# Patient Record
Sex: MALE | Race: WHITE | NOT HISPANIC OR LATINO | Employment: UNEMPLOYED | URBAN - METROPOLITAN AREA
[De-identification: names, ages, dates, MRNs, and addresses within clinical notes are randomized per-mention and may not be internally consistent; named-entity substitution may affect disease eponyms.]

---

## 2024-01-29 ENCOUNTER — HOSPITAL ENCOUNTER (EMERGENCY)
Facility: HOSPITAL | Age: 64
Discharge: HOME | End: 2024-01-29
Attending: EMERGENCY MEDICINE
Payer: MEDICAID

## 2024-01-29 VITALS
SYSTOLIC BLOOD PRESSURE: 150 MMHG | WEIGHT: 146.38 LBS | OXYGEN SATURATION: 99 % | HEART RATE: 89 BPM | HEIGHT: 67 IN | TEMPERATURE: 98 F | RESPIRATION RATE: 19 BRPM | DIASTOLIC BLOOD PRESSURE: 99 MMHG | BODY MASS INDEX: 22.98 KG/M2

## 2024-01-29 DIAGNOSIS — M54.50 ACUTE LEFT-SIDED LOW BACK PAIN WITHOUT SCIATICA: ICD-10-CM

## 2024-01-29 DIAGNOSIS — N47.6 BALANOPOSTHITIS: Primary | ICD-10-CM

## 2024-01-29 LAB
ALBUMIN SERPL-MCNC: 3.8 G/DL (ref 3.4–4.8)
ALBUMIN/GLOB SERPL: 1.2 RATIO (ref 1.1–2)
ALP SERPL-CCNC: 76 UNIT/L (ref 40–150)
ALT SERPL-CCNC: 16 UNIT/L (ref 0–55)
APPEARANCE UR: CLEAR
AST SERPL-CCNC: 27 UNIT/L (ref 5–34)
BACTERIA #/AREA URNS AUTO: ABNORMAL /HPF
BASOPHILS # BLD AUTO: 0.03 X10(3)/MCL
BASOPHILS NFR BLD AUTO: 0.5 %
BILIRUB SERPL-MCNC: 0.3 MG/DL
BILIRUB UR QL STRIP.AUTO: NEGATIVE
BUN SERPL-MCNC: 13 MG/DL (ref 8.4–25.7)
C TRACH DNA SPEC QL NAA+PROBE: NOT DETECTED
CALCIUM SERPL-MCNC: 9.4 MG/DL (ref 8.8–10)
CHLORIDE SERPL-SCNC: 105 MMOL/L (ref 98–107)
CO2 SERPL-SCNC: 28 MMOL/L (ref 23–31)
COLOR UR AUTO: ABNORMAL
CREAT SERPL-MCNC: 0.85 MG/DL (ref 0.73–1.18)
EOSINOPHIL # BLD AUTO: 0.13 X10(3)/MCL (ref 0–0.9)
EOSINOPHIL NFR BLD AUTO: 2 %
ERYTHROCYTE [DISTWIDTH] IN BLOOD BY AUTOMATED COUNT: 13.2 % (ref 11.5–17)
GFR SERPLBLD CREATININE-BSD FMLA CKD-EPI: >60 MLS/MIN/1.73/M2
GLOBULIN SER-MCNC: 3.1 GM/DL (ref 2.4–3.5)
GLUCOSE SERPL-MCNC: 89 MG/DL (ref 82–115)
GLUCOSE UR QL STRIP.AUTO: NORMAL
HCT VFR BLD AUTO: 42.5 % (ref 42–52)
HGB BLD-MCNC: 14.8 G/DL (ref 14–18)
HOLD SPECIMEN: NORMAL
HYALINE CASTS #/AREA URNS LPF: ABNORMAL /LPF
IMM GRANULOCYTES # BLD AUTO: 0.02 X10(3)/MCL (ref 0–0.04)
IMM GRANULOCYTES NFR BLD AUTO: 0.3 %
KETONES UR QL STRIP.AUTO: NEGATIVE
LEUKOCYTE ESTERASE UR QL STRIP.AUTO: NEGATIVE
LYMPHOCYTES # BLD AUTO: 0.82 X10(3)/MCL (ref 0.6–4.6)
LYMPHOCYTES NFR BLD AUTO: 12.6 %
MCH RBC QN AUTO: 35.2 PG (ref 27–31)
MCHC RBC AUTO-ENTMCNC: 34.8 G/DL (ref 33–36)
MCV RBC AUTO: 101 FL (ref 80–94)
MONOCYTES # BLD AUTO: 0.45 X10(3)/MCL (ref 0.1–1.3)
MONOCYTES NFR BLD AUTO: 6.9 %
MUCOUS THREADS URNS QL MICRO: ABNORMAL /LPF
N GONORRHOEA DNA SPEC QL NAA+PROBE: NOT DETECTED
NEUTROPHILS # BLD AUTO: 5.04 X10(3)/MCL (ref 2.1–9.2)
NEUTROPHILS NFR BLD AUTO: 77.7 %
NITRITE UR QL STRIP.AUTO: NEGATIVE
NRBC BLD AUTO-RTO: 0 %
PH UR STRIP.AUTO: 6 [PH]
PLATELET # BLD AUTO: 151 X10(3)/MCL (ref 130–400)
PMV BLD AUTO: 10.6 FL (ref 7.4–10.4)
POTASSIUM SERPL-SCNC: 4.5 MMOL/L (ref 3.5–5.1)
PROT SERPL-MCNC: 6.9 GM/DL (ref 5.8–7.6)
PROT UR QL STRIP.AUTO: NEGATIVE
RBC # BLD AUTO: 4.21 X10(6)/MCL (ref 4.7–6.1)
RBC #/AREA URNS AUTO: ABNORMAL /HPF
RBC UR QL AUTO: NEGATIVE
SODIUM SERPL-SCNC: 141 MMOL/L (ref 136–145)
SOURCE (OHS): NORMAL
SP GR UR STRIP.AUTO: 1.01 (ref 1–1.03)
SQUAMOUS #/AREA URNS LPF: ABNORMAL /HPF
UROBILINOGEN UR STRIP-ACNC: NORMAL
WBC # SPEC AUTO: 6.49 X10(3)/MCL (ref 4.5–11.5)
WBC #/AREA URNS AUTO: ABNORMAL /HPF

## 2024-01-29 PROCEDURE — 87491 CHLMYD TRACH DNA AMP PROBE: CPT | Performed by: NURSE PRACTITIONER

## 2024-01-29 PROCEDURE — 85025 COMPLETE CBC W/AUTO DIFF WBC: CPT | Performed by: NURSE PRACTITIONER

## 2024-01-29 PROCEDURE — 99284 EMERGENCY DEPT VISIT MOD MDM: CPT

## 2024-01-29 PROCEDURE — 80053 COMPREHEN METABOLIC PANEL: CPT | Performed by: NURSE PRACTITIONER

## 2024-01-29 PROCEDURE — 81001 URINALYSIS AUTO W/SCOPE: CPT | Performed by: NURSE PRACTITIONER

## 2024-01-29 RX ORDER — HYDROCORTISONE 1 %
CREAM (GRAM) TOPICAL 2 TIMES DAILY
Qty: 30 G | Refills: 0 | OUTPATIENT
Start: 2024-01-29 | End: 2024-02-08

## 2024-01-29 RX ORDER — DICLOFENAC SODIUM 75 MG/1
75 TABLET, DELAYED RELEASE ORAL 2 TIMES DAILY PRN
Qty: 20 TABLET | Refills: 0 | Status: SHIPPED | OUTPATIENT
Start: 2024-01-29

## 2024-01-29 RX ORDER — CLOTRIMAZOLE 1 %
CREAM (GRAM) TOPICAL 2 TIMES DAILY
Qty: 28 G | Refills: 1 | OUTPATIENT
Start: 2024-01-29 | End: 2024-02-08

## 2024-01-29 RX ORDER — BACLOFEN 10 MG/1
10 TABLET ORAL 3 TIMES DAILY PRN
Qty: 20 TABLET | Refills: 0 | Status: SHIPPED | OUTPATIENT
Start: 2024-01-29

## 2024-01-29 NOTE — ED PROVIDER NOTES
Encounter Date: 1/29/2024       History     Chief Complaint   Patient presents with    penile rash     Pain blistered rash to penis that started on Friday. +dysuria.      The patient presents with itchy rash to penis.  The onset was 3 days ago.  The course/duration of symptoms is constant.  Location: penis. The character of symptoms is itching.  Radiating symptom: none.  The degree of symptoms is minimal.  Risk factors consist of none.  Prior episodes: none.  Associated symptoms: rash burns when urinating, denies fever, denies chills and denies shortness of breath. He also reports right sided low back pain for a few days. No known injury.      Review of patient's allergies indicates:  No Known Allergies  History reviewed. No pertinent past medical history.  History reviewed. No pertinent surgical history.  History reviewed. No pertinent family history.     Review of Systems   Constitutional:  Negative for fever.   HENT:  Negative for sore throat.    Respiratory:  Negative for shortness of breath.    Cardiovascular:  Negative for chest pain.   Gastrointestinal:  Negative for nausea.   Genitourinary:  Positive for penile pain. Negative for dysuria.   Musculoskeletal:  Positive for back pain.   Skin:  Negative for rash.   Neurological:  Negative for weakness.   Hematological:  Does not bruise/bleed easily.   All other systems reviewed and are negative.      Physical Exam     Initial Vitals [01/29/24 1004]   BP Pulse Resp Temp SpO2   (!) 164/106 99 18 98.1 °F (36.7 °C) 98 %      MAP       --         Physical Exam    Nursing note and vitals reviewed.  Constitutional: He appears well-developed and well-nourished.   HENT:   Head: Normocephalic and atraumatic.   Neck: Neck supple.   Normal range of motion.  Cardiovascular:  Normal rate, regular rhythm, normal heart sounds and intact distal pulses.           Pulmonary/Chest: Effort normal and breath sounds normal. He has no decreased breath sounds.   Abdominal: Abdomen is  soft and flat. Bowel sounds are normal. There is no abdominal tenderness.   Genitourinary:    Testes normal.   Cremasteric reflex is present.    Genitourinary Comments: Blistery rash to foreskin and glans of penis consistent with Balanoposthitis      Musculoskeletal:         General: Normal range of motion.      Cervical back: Normal range of motion and neck supple.      Comments: No costovertebral angle tenderness, , Thoracic: no vertebral point tenderness, Lumbar: Right lateral mild tenderness, midline negative, no vertebral point tenderness, Sacral: no vertebral point tenderness, Testing: Straight leg raising, supine negative.  No C/T/L point tenderness. normal reflexes.     Neurological: He is alert and oriented to person, place, and time. He has normal strength.   Skin: Skin is warm and dry.   Psychiatric: He has a normal mood and affect.         ED Course   Procedures  Labs Reviewed   URINALYSIS, REFLEX TO URINE CULTURE - Abnormal; Notable for the following components:       Result Value    Bacteria, UA Trace (*)     Mucous, UA Trace (*)     All other components within normal limits   CBC WITH DIFFERENTIAL - Abnormal; Notable for the following components:    RBC 4.21 (*)     .0 (*)     MCH 35.2 (*)     MPV 10.6 (*)     All other components within normal limits   CHLAMYDIA/GONORRHOEAE(GC), PCR    Narrative:     The Xpert CT/NG test, performed on the GeneXpert system is a qualitative in vitro real-time polymerase chain reaction (PCR) test for the automated detected and differentiation for genomic DNA from Chlamydia trachomatis (CT) and/or Neisseria gonorrhoeae (NG).   CBC W/ AUTO DIFFERENTIAL    Narrative:     The following orders were created for panel order CBC auto differential.  Procedure                               Abnormality         Status                     ---------                               -----------         ------                     CBC with Differential[[id number]]       Abnormal             Final result                 Please view results for these tests on the individual orders.   COMPREHENSIVE METABOLIC PANEL   EXTRA TUBES    Narrative:     The following orders were created for panel order EXTRA TUBES.  Procedure                               Abnormality         Status                     ---------                               -----------         ------                     Light Blue Top Hold[[id number]]                             In process                 Gold Top Hold[[id number]]                                   In process                 Pink Top Hold[[id number]]                                   In process                   Please view results for these tests on the individual orders.   LIGHT BLUE TOP HOLD   GOLD TOP HOLD   PINK TOP HOLD          Imaging Results    None          Medications - No data to display  Medical Decision Making  The patient presents with itchy rash to penis.  The onset was 3 days ago.  The course/duration of symptoms is constant.  Location: penis. The character of symptoms is itching.  Radiating symptom: none.  The degree of symptoms is minimal.  Risk factors consist of none.  Prior episodes: none.  Associated symptoms: rash burns when urinating, denies fever, denies chills and denies shortness of breath. He also reports right sided low back pain for a few days. No known injury.    1:24 PM DISPOSITION: The patient is resting comfortably in no acute distress.  He is hemodynamically stable and is without objective evidence for acute process requiring urgent intervention or hospitalization. I provided counseling to patient with regard to condition, the treatment plan, specific conditions for return, and the importance of follow up. Detailed written and verbal instructions provided to patient and he expressed a verbal understanding of the discharge instructions and overall management plan. Reiterated the importance of medication administration and safety and  advised patient to follow up with primary care provider in 3-5 days or sooner if needed.  Answered questions at this time. The patient is stable for discharge.       Amount and/or Complexity of Data Reviewed  Labs: ordered.      Additional MDM:   Differential Diagnosis:   At this time differential diagnosis is but not limited to uti, gc/chlamydia, herpes, balanitis                                      Clinical Impression:  Final diagnoses:  [N47.6] Balanoposthitis (Primary)  [M54.50] Acute left-sided low back pain without sciatica          ED Disposition Condition    Discharge Stable          ED Prescriptions       Medication Sig Dispense Start Date End Date Auth. Provider    baclofen (LIORESAL) 10 MG tablet Take 1 tablet (10 mg total) by mouth 3 (three) times daily as needed (pain or spasms). May cause drowsiness 20 tablet 1/29/2024 -- Valentino Eli ACNP    diclofenac (VOLTAREN) 75 MG EC tablet Take 1 tablet (75 mg total) by mouth 2 (two) times daily as needed (pain). 20 tablet 1/29/2024 -- Valentino Eli ACNP    clotrimazole (LOTRIMIN) 1 % cream Apply topically 2 (two) times daily. Apply to affected area 2 times daily for 21 days 28 g 1/29/2024 2/19/2024 Valentino Eli ACNP    hydrocortisone 1 % cream Apply topically 2 (two) times daily. Apply to affected area 2 times daily, do not get in eyes. for 7 days 30 g 1/29/2024 2/5/2024 Valentino Eli ACNP          Follow-up Information       Follow up With Specialties Details Why Contact Info    follow up with your primary care provider in 3-5 days        [facility] Emergency Dept Emergency Medicine  If symptoms worsen [address] [facility] [address] [phone]             Valentino Eli ACNP  01/29/24 0759

## 2024-02-08 ENCOUNTER — HOSPITAL ENCOUNTER (EMERGENCY)
Facility: HOSPITAL | Age: 64
Discharge: HOME | End: 2024-02-08
Attending: EMERGENCY MEDICINE
Payer: MEDICAID

## 2024-02-08 VITALS
WEIGHT: 149.94 LBS | HEIGHT: 68 IN | TEMPERATURE: 98 F | BODY MASS INDEX: 22.72 KG/M2 | RESPIRATION RATE: 18 BRPM | OXYGEN SATURATION: 99 % | HEART RATE: 79 BPM | DIASTOLIC BLOOD PRESSURE: 84 MMHG | SYSTOLIC BLOOD PRESSURE: 142 MMHG

## 2024-02-08 DIAGNOSIS — B02.8 HERPES ZOSTER WITH OTHER COMPLICATION: Primary | ICD-10-CM

## 2024-02-08 PROCEDURE — 96372 THER/PROPH/DIAG INJ SC/IM: CPT | Performed by: PHYSICIAN ASSISTANT

## 2024-02-08 PROCEDURE — 99284 EMERGENCY DEPT VISIT MOD MDM: CPT | Mod: 25

## 2024-02-08 PROCEDURE — 63600175 PHARM REV CODE 636 W HCPCS: Performed by: PHYSICIAN ASSISTANT

## 2024-02-08 RX ORDER — VALACYCLOVIR HYDROCHLORIDE 1 G/1
1000 TABLET, FILM COATED ORAL 3 TIMES DAILY
Qty: 21 TABLET | Refills: 0 | Status: SHIPPED | OUTPATIENT
Start: 2024-02-08 | End: 2024-04-30

## 2024-02-08 RX ORDER — DEXAMETHASONE SODIUM PHOSPHATE 4 MG/ML
8 INJECTION, SOLUTION INTRA-ARTICULAR; INTRALESIONAL; INTRAMUSCULAR; INTRAVENOUS; SOFT TISSUE
Status: COMPLETED | OUTPATIENT
Start: 2024-02-08 | End: 2024-02-08

## 2024-02-08 RX ORDER — HYDROCODONE BITARTRATE AND ACETAMINOPHEN 7.5; 325 MG/1; MG/1
1 TABLET ORAL EVERY 6 HOURS PRN
Qty: 10 TABLET | Refills: 0 | Status: SHIPPED | OUTPATIENT
Start: 2024-02-08 | End: 2024-02-13

## 2024-02-08 RX ORDER — METHYLPREDNISOLONE 4 MG/1
TABLET ORAL
Qty: 21 TABLET | Refills: 0 | Status: SHIPPED | OUTPATIENT
Start: 2024-02-08 | End: 2024-04-30

## 2024-02-08 RX ORDER — GABAPENTIN 300 MG/1
300 CAPSULE ORAL 3 TIMES DAILY PRN
Qty: 30 CAPSULE | Refills: 0 | Status: SHIPPED | OUTPATIENT
Start: 2024-02-08 | End: 2024-03-09

## 2024-02-08 RX ADMIN — DEXAMETHASONE SODIUM PHOSPHATE 8 MG: 4 INJECTION, SOLUTION INTRA-ARTICULAR; INTRALESIONAL; INTRAMUSCULAR; INTRAVENOUS; SOFT TISSUE at 04:02

## 2024-02-08 NOTE — ED PROVIDER NOTES
Encounter Date: 2/8/2024       History     Chief Complaint   Patient presents with    Skin Problem     PT REPORTS TO THE C/O LESIONS, REDNESS, AND DISCOMFORT TO LEFT GROIN AND BUTTOCKS SINCE 1/27/24. ALSO STATES HE WAS SEEN HERE APPROXIMATELY 10 DAYS AGO AND DIAGNOSED WITH A YEAST INFECTION. RATES PAIN 7/10 AT THIS TIME. VSS. NADABDELRAHMAN     63-year-old male with no known significant past medical history presents to ED complaining of skin changes to left groin and buttocks with associated pain for the past 12 days.  Patient was seen in this ED 10 days ago and diagnosed with balanoposthitis and discharged with steroid and antifungal creams, but reports symptoms have continued to spread.  Reports lesions begin as vesicles which then rupture and leave a painful ulcerated base.  Patient reports pain with urination.  Denies any recent sexual contact, stating it has been over 4 years.  Denies any history of STD or similar skin symptoms.  Denies skin abnormality at any other site on his body.  Denies fever, chills, nausea, vomiting, diarrhea, penile discharge, hematuria, testicular swelling.  Vital signs stable on arrival, patient in no acute distress.      Review of patient's allergies indicates:  No Known Allergies  No past medical history on file.  No past surgical history on file.  No family history on file.     Review of Systems   All other systems reviewed and are negative.      Physical Exam     Initial Vitals [02/08/24 1548]   BP Pulse Resp Temp SpO2   (!) 142/84 79 18 98.2 °F (36.8 °C) 99 %      MAP       --         Physical Exam    Nursing note and vitals reviewed.  Constitutional: He appears well-developed and well-nourished. He is not diaphoretic. No distress.   HENT:   Head: Normocephalic and atraumatic.   Mouth/Throat: No oropharyngeal exudate.   Eyes: Conjunctivae and EOM are normal. Pupils are equal, round, and reactive to light. Right eye exhibits no discharge. Left eye exhibits no discharge.   Neck: Neck supple.    Normal range of motion.  Cardiovascular:  Normal rate, regular rhythm, normal heart sounds and intact distal pulses.     Exam reveals no gallop and no friction rub.       No murmur heard.  Pulmonary/Chest: Breath sounds normal. No respiratory distress. He has no wheezes. He has no rhonchi. He has no rales.   Abdominal: Abdomen is soft. Bowel sounds are normal. He exhibits no distension. There is no abdominal tenderness. There is no rebound and no guarding.   Genitourinary:    Genitourinary Comments: Exam chaperoned by Arelis De León RN         Musculoskeletal:         General: Normal range of motion.      Cervical back: Normal range of motion and neck supple.     Neurological: He is alert and oriented to person, place, and time. No cranial nerve deficit.   Skin: Skin is warm and dry. Capillary refill takes less than 2 seconds. Rash noted.   Psychiatric: He has a normal mood and affect.         ED Course   Procedures  Labs Reviewed - No data to display       Imaging Results    None          Medications   dexAMETHasone injection 8 mg (8 mg Intramuscular Given 2/8/24 5564)     Medical Decision Making  Differential diagnosis: Includes but not limited to shingles, STD, Coreen     ED management:  Patient given IM Decadron prior to discharge.      ED course:  Patient evaluated by Dr. Simeon and Dr. Simeon believes symptoms due to shingles.  Patient is nontoxic appearing with unremarkable vitals, stable for discharge.  I will send patient home with medications for symptom relief.  Discussed home hygiene instructions.  We will refer patient to urology clinic for further evaluation.  I will also refer patient to Internal Medicine Clinic to establish him with a PCP and discussed the importance of having a PCP he sees regularly.  Strict ED precautions given for new or worsening symptoms and patient verbalized understanding.  All questions answered.    Amount and/or Complexity of Data Reviewed  External Data Reviewed: labs  and notes.     Details: Reviewed notes and labs from most recent ED visit.                                      Clinical Impression:  Final diagnoses:  [B02.8] Herpes zoster with other complication (Primary)          ED Disposition Condition    Discharge Good          ED Prescriptions       Medication Sig Dispense Start Date End Date Auth. Provider    methylPREDNISolone (MEDROL DOSEPACK) 4 mg tablet Take all tablets as directed on packaging 21 tablet 2/8/2024 -- Deonte Nash PA    valACYclovir (VALTREX) 1000 MG tablet Take 1 tablet (1,000 mg total) by mouth 3 (three) times daily. for 7 days 21 tablet 2/8/2024 2/15/2024 Deonte Nash PA    gabapentin (NEURONTIN) 300 MG capsule Take 1 capsule (300 mg total) by mouth 3 (three) times daily as needed (pain). 30 capsule 2/8/2024 3/9/2024 Deonte Nash PA    HYDROcodone-acetaminophen (NORCO) 7.5-325 mg per tablet Take 1 tablet by mouth every 6 (six) hours as needed for Pain. 10 tablet 2/8/2024 2/13/2024 Deonte Nash PA    mineral oil-hydrophil petrolat (AQUAPHOR) Oint Apply topically as needed. 106 g 2/8/2024 -- Deonte Nash PA          Follow-up Information       Follow up With Specialties Details Why Contact Info Additional Information    [facility] Internal Medicine Internal Medicine In 2 weeks  [address] [facility] [address] [phone] Internal Medicine Clinic Entrance #1    [facility] Urology Urology In 2 weeks  [address] [facility] [address] [phone]     [facility] Emergency Dept Emergency Medicine  As needed, If symptoms worsen [address] [place] [id number] [phone]              Deonte Nash PA  02/08/24 2667

## 2024-02-08 NOTE — DISCHARGE INSTRUCTIONS
Report to Emergency Department if symptoms return or worsen; [facility] - Medicine Clinic Within 1 to 2 days, It is important that you follow up with your primary care provider or specialist if indicated for further evaluation, workup, and treatment as necessary. The exam and treatment you received in Emergency Department was for an urgent problem and NOT INTENDED AS COMPLETE CARE. It is important that you FOLLOW UP with a doctor for ongoing care. If your symptoms become WORSE or you DO NOT IMPROVE and you are unable to reach your health care provider, you should RETURN to the Emergency Department. The Emergency Department provider has provided a PRELIMINARY INTERPRETATION of all your studies. A final interpretation may be done after you are discharged. If a change in your diagnosis or treatment is needed WE WILL CONTACT YOU. It is critical that we have a CURRENT PHONE NUMBER FOR YOU.

## 2024-03-25 ENCOUNTER — TELEPHONE (OUTPATIENT)
Dept: RHEUMATOLOGY | Facility: CLINIC | Age: 64
End: 2024-03-25
Payer: MEDICAID

## 2024-03-25 DIAGNOSIS — R76.8 ANA POSITIVE: Primary | ICD-10-CM

## 2024-03-25 NOTE — TELEPHONE ENCOUNTER
Received rheum referral for positive MICHAEL without lab results. Called referring provider, Jessika armstrong at [phone]. Left detailed message asking for positive MICHAEL results to be sent.

## 2024-03-25 NOTE — TELEPHONE ENCOUNTER
Called and spoke with referring provider's office and spoke with Katie. I was told that she will send lab results to complete rheum referral.

## 2024-04-09 DIAGNOSIS — R22.42 ANKLE MASS, LEFT: Primary | ICD-10-CM

## 2024-04-30 ENCOUNTER — OFFICE VISIT (OUTPATIENT)
Dept: UROLOGY | Facility: CLINIC | Age: 64
End: 2024-04-30
Payer: MEDICAID

## 2024-04-30 ENCOUNTER — LAB VISIT (OUTPATIENT)
Dept: LAB | Facility: HOSPITAL | Age: 64
End: 2024-04-30
Attending: NURSE PRACTITIONER
Payer: MEDICAID

## 2024-04-30 VITALS
DIASTOLIC BLOOD PRESSURE: 83 MMHG | WEIGHT: 142.38 LBS | SYSTOLIC BLOOD PRESSURE: 149 MMHG | HEART RATE: 101 BPM | OXYGEN SATURATION: 99 % | RESPIRATION RATE: 20 BRPM | HEIGHT: 68 IN | BODY MASS INDEX: 21.58 KG/M2 | TEMPERATURE: 98 F

## 2024-04-30 DIAGNOSIS — B02.8 HERPES ZOSTER WITH OTHER COMPLICATION: ICD-10-CM

## 2024-04-30 DIAGNOSIS — Z12.5 SCREENING PSA (PROSTATE SPECIFIC ANTIGEN): Primary | ICD-10-CM

## 2024-04-30 DIAGNOSIS — Z12.5 SCREENING PSA (PROSTATE SPECIFIC ANTIGEN): ICD-10-CM

## 2024-04-30 DIAGNOSIS — B02.8 HERPES ZOSTER WITH COMPLICATION: ICD-10-CM

## 2024-04-30 LAB — PSA SERPL-MCNC: 0.57 NG/ML

## 2024-04-30 PROCEDURE — 36415 COLL VENOUS BLD VENIPUNCTURE: CPT

## 2024-04-30 PROCEDURE — 84153 ASSAY OF PSA TOTAL: CPT

## 2024-04-30 PROCEDURE — 3079F DIAST BP 80-89 MM HG: CPT | Mod: CPTII,,, | Performed by: NURSE PRACTITIONER

## 2024-04-30 PROCEDURE — 1160F RVW MEDS BY RX/DR IN RCRD: CPT | Mod: CPTII,,, | Performed by: NURSE PRACTITIONER

## 2024-04-30 PROCEDURE — 1159F MED LIST DOCD IN RCRD: CPT | Mod: CPTII,,, | Performed by: NURSE PRACTITIONER

## 2024-04-30 PROCEDURE — 3077F SYST BP >= 140 MM HG: CPT | Mod: CPTII,,, | Performed by: NURSE PRACTITIONER

## 2024-04-30 PROCEDURE — 99215 OFFICE O/P EST HI 40 MIN: CPT | Mod: PBBFAC | Performed by: NURSE PRACTITIONER

## 2024-04-30 PROCEDURE — 99204 OFFICE O/P NEW MOD 45 MIN: CPT | Mod: S$PBB,,, | Performed by: NURSE PRACTITIONER

## 2024-04-30 PROCEDURE — 3008F BODY MASS INDEX DOCD: CPT | Mod: CPTII,,, | Performed by: NURSE PRACTITIONER

## 2024-04-30 RX ORDER — METHYLPREDNISOLONE 4 MG/1
TABLET ORAL
Qty: 21 EACH | Refills: 0 | Status: SHIPPED | OUTPATIENT
Start: 2024-04-30 | End: 2024-05-21

## 2024-04-30 RX ORDER — VALACYCLOVIR HYDROCHLORIDE 1 G/1
1000 TABLET, FILM COATED ORAL 2 TIMES DAILY
Qty: 21 TABLET | Refills: 0 | Status: SHIPPED | OUTPATIENT
Start: 2024-04-30 | End: 2025-04-30

## 2024-04-30 RX ORDER — LORATADINE 10 MG
10 TABLET,DISINTEGRATING ORAL DAILY
COMMUNITY

## 2024-04-30 RX ORDER — NYSTATIN 500000 [USP'U]/1
500000 TABLET, COATED ORAL EVERY 8 HOURS
COMMUNITY

## 2024-04-30 NOTE — PROGRESS NOTES
Placed in room. Seen by Caleb Kent NP. Spoke with patient. Prostate exam done. Sent to lab  for PSA. RTC in 6 months with a PSA.

## 2024-04-30 NOTE — PROGRESS NOTES
Chief Complaint:   Chief Complaint   Patient presents with    dysuria-shingles to penis       HPI:  Patient is a 63-year-old male referred to Urology for dysuria penile shingles.  Patient seen in emergency room on 02/08/2024 with evidence of left-sided penile and buttock shingles noted associated pain for the past 12 days.  Today patient presents with intermittent rash and pain to the left side of his penis and his groin area all the way up to his buttock no current raised rash at this time.  Patient does complain of some painful irritation at 5/10 intermittently.  Patient denies any urinary frequency, urinary urgency, urinary hesitancy, urinary retention, gross hematuria, nocturia.  Patient would like to get his PSA and prostate exam done due to has never had it done.  Plan PSA now and I will notify him results when completed, RTC 6 months with PSA.  Instructed patient to follow up with PCP regarding a referral to neurology for shingles and also to be treated for his anxiety.  Allergies:  Review of patient's allergies indicates:  No Known Allergies    Medications:  Current Outpatient Medications   Medication Sig Dispense Refill    loratadine (CLARITIN REDITABS) 10 mg dissolvable tablet Take 10 mg by mouth once daily.      mineral oil-hydrophil petrolat (AQUAPHOR) Oint Apply topically as needed. 106 g 0    nystatin (MYCOSTATIN) 500,000 unit Tab Take 500,000 Units by mouth every 8 (eight) hours.      baclofen (LIORESAL) 10 MG tablet Take 1 tablet (10 mg total) by mouth 3 (three) times daily as needed (pain or spasms). May cause drowsiness (Patient not taking: Reported on 4/30/2024) 20 tablet 0    diclofenac (VOLTAREN) 75 MG EC tablet Take 1 tablet (75 mg total) by mouth 2 (two) times daily as needed (pain). (Patient not taking: Reported on 4/30/2024) 20 tablet 0    gabapentin (NEURONTIN) 300 MG capsule Take 1 capsule (300 mg total) by mouth 3 (three) times daily as needed (pain). 30 capsule 0    methylPREDNISolone  (MEDROL DOSEPACK) 4 mg tablet Take all tablets as directed on packaging (Patient not taking: Reported on 4/30/2024) 21 tablet 0    valACYclovir (VALTREX) 1000 MG tablet Take 1 tablet (1,000 mg total) by mouth 3 (three) times daily. for 7 days 21 tablet 0     No current facility-administered medications for this visit.       Review of Systems:  General: No fever, chills, fatigability, or weight loss.  Skin: No rashes, itching, or changes in color or texture of skin.  Chest: Denies OCHOA, cyanosis, wheezing, cough, and sputum production.  Abdomen: Appetite fine. No weight loss. Denies diarrhea, abdominal pain, hematemesis, or blood in stool.  Musculoskeletal: No joint stiffness or swelling. Denies back pain.  : As above.  All other review of systems negative.    PMH:  No past medical history on file.    PSH:  No past surgical history on file.    FamHx:  No family history on file.    SocHx:  Social History     Socioeconomic History    Marital status: Single   Tobacco Use    Smoking status: Former     Types: Cigarettes     Passive exposure: Never    Smokeless tobacco: Never   Substance and Sexual Activity    Drug use: Never    Sexual activity: Not Currently       Physical Exam:  Vitals:    04/30/24 0829   BP: (!) 149/83   Pulse: 101   Resp: 20   Temp: 98.4 °F (36.9 °C)     General: A&Ox3, no apparent distress, no deformities  Neck: No masses, normal thyroid  Lungs: CTA ankita, no use of accessory muscles  Heart: RRR, no arrhythmias  Abdomen: Soft, NT, ND, no masses, no hernias, no hepatosplenomegaly  Lymphatic: Neck and groin nodes negative  Skin: The skin is warm and dry. No jaundice.  Ext: No c/c/e.    GUMale: Test desc ankita, no abnormalities of epididymus. Penis uncircumcised, with normal penile and scrotal skin. Meatus normal. Normal rectal tone, no hemorrhoids. Prostate:2 finger breadth wide, smooth, 1/2 fingerbreadths thick, soft, nontender, no nodules or masses appreciated. SV not palpable. Perineum and anus  normal.        Impression:  1. Herpes zoster with other complication  - Ambulatory referral/consult to Urology  2. PSA screening  3. Anxiety    Plan:  Instructed patient to get PSA now and will notify results when completed, RTC 6 months with PSA.  Instructed patient to follow up with PCP to be referred to neurology for shingles treatment and anxiety.  Instructed patient if develops any abnormal urologic symptoms notify clinic to be re-evaluate treated or go to emergency room.

## 2024-05-13 ENCOUNTER — HOSPITAL ENCOUNTER (OUTPATIENT)
Dept: RADIOLOGY | Facility: HOSPITAL | Age: 64
Discharge: HOME | End: 2024-05-13
Attending: STUDENT IN AN ORGANIZED HEALTH CARE EDUCATION/TRAINING PROGRAM
Payer: MEDICAID

## 2024-05-13 ENCOUNTER — OFFICE VISIT (OUTPATIENT)
Dept: ORTHOPEDICS | Facility: CLINIC | Age: 64
End: 2024-05-13
Payer: MEDICAID

## 2024-05-13 VITALS
BODY MASS INDEX: 21.56 KG/M2 | WEIGHT: 137.38 LBS | HEART RATE: 101 BPM | SYSTOLIC BLOOD PRESSURE: 130 MMHG | HEIGHT: 67 IN | DIASTOLIC BLOOD PRESSURE: 85 MMHG

## 2024-05-13 DIAGNOSIS — M19.90 INFLAMMATORY ARTHRITIS: ICD-10-CM

## 2024-05-13 DIAGNOSIS — M25.579 ANKLE PAIN, UNSPECIFIED CHRONICITY, UNSPECIFIED LATERALITY: ICD-10-CM

## 2024-05-13 DIAGNOSIS — M19.072 ARTHRITIS OF BOTH ANKLES: Primary | ICD-10-CM

## 2024-05-13 DIAGNOSIS — R22.42 ANKLE MASS, LEFT: ICD-10-CM

## 2024-05-13 DIAGNOSIS — M65.9 SYNOVITIS: ICD-10-CM

## 2024-05-13 DIAGNOSIS — M19.071 ARTHRITIS OF BOTH ANKLES: Primary | ICD-10-CM

## 2024-05-13 PROCEDURE — 99214 OFFICE O/P EST MOD 30 MIN: CPT | Mod: PBBFAC,25

## 2024-05-13 PROCEDURE — 73630 X-RAY EXAM OF FOOT: CPT | Mod: TC,LT

## 2024-05-13 PROCEDURE — 73610 X-RAY EXAM OF ANKLE: CPT | Mod: TC,RT

## 2024-05-13 PROCEDURE — 73610 X-RAY EXAM OF ANKLE: CPT | Mod: TC,LT

## 2024-05-13 PROCEDURE — 73630 X-RAY EXAM OF FOOT: CPT | Mod: TC,RT

## 2024-05-13 RX ORDER — PREDNISONE 20 MG/1
20 TABLET ORAL DAILY
Qty: 5 TABLET | Refills: 0 | Status: SHIPPED | OUTPATIENT
Start: 2024-05-13

## 2024-05-13 NOTE — PROGRESS NOTES
"Subjective:    Patient ID: Mitch Ruiz is a 63 y.o. male  who presented to [facility] for new visit.      Chief Complaint: Pain of the Right Ankle and Pain of the Left Ankle      History of Present Illness:    Mitch Ruiz who has a history of PMH RA, lumbar spondylosis, bilateral shoulder surgeries at [facility], presents with BL ankle pain present for the last year and left ankle mass present for the last 6 months.  On the left ankle pain is located laterally over the lateral ankle mass and over the lateral 5th metatarsal.  Right ankle pain is located in the anterior ankle around the midfoot and tibiotalar joint.  Pain is 7/10, aching and throbbing, worse with walking and standing. Evaluation to date: plain films, MRI, PCP evaluation, and Ortho evaluation. Treatment to date: bracing, CSI, and ice/heat.  He has been unable to work for the last 8 months due to ankle pain.    He has an appointment with Rheumatology around the 22nd of this month.  He does not have a cardiac history, is not on any blood thinners.    Ankle/Foot Review of Systems:  Swelling?  yes  Instability?  no  Mechanical sx?  no  <30 min AM stiffness? yes  Limited ROM? yes  Fever/Chills? no       Objective:      Physical Exam:    /85   Pulse 101   Ht 5' 7" (1.702 m)   Wt 62.3 kg (137 lb 5.6 oz)   BMI 21.51 kg/m²     Ortho/SPM Exam    Appearance:  antalgic  FWB  Large lateral left ankle mass just proximal to the tibiotalar joint.    Soft tissue swelling: Left: no Right: no  Effusion: Left:  Negative Right: Negative  Erythema: Left no Right: no  Ecchymosis: Left: no Right: no  Atrophy: Left: no Right: no    Palpation:  Ankle/Foot Tenderness:  General lateral ankle and anterior midfoot bilaterally    Range of motion:  Ankle dorsiflexion (20 degrees):    Lacking 5-10 degrees bilaterally  Ankle Plantar flexion (50 degrees):  Lacking 5-10 degrees bilaterally  Subtalar inversion (5 degrees): Left: 5 " Right 5  Subtalar eversion (5 degrees):  Left: 5 Right 5  Transverse/midtarsal: adduction (20 degrees): Left: 20 Right: 20  Transverse/midtarsal abduction (10 degrees): Left: 10 Right: 10    Strength:  Foot inversion/dorsiflexion (Deep Peroneal N. L4):Left: 5/5  Pain: no Right: 5/5  Pain: no  1st toe Dorsiflexion (Deep Peroneal N. L5): Left: 5/5  Pain: no Right: 5/5  Pain: no  Foot plantarflexion (Tibial N. S1): Left: 5/5  Pain: no Right: 5/5  Pain: no  Foot eversion (superficial peroneal L4-S1): Left: 5/5  Pain: no Right: 5/5  Pain: no    Special Tests:  Cano:  Left: Not performed     Right: Not performed   Anterior drawer: Left: Negative     Right: Negative   Talar tilt: Left: Negative      Right: Negative   External rotation stress (Kleiger's): Left: Negative  Right: Negative  Eversion stress: Left: Negative Right: Negative   Squeeze: Left: Negative  Right: Negative  Heel rise: Left: Not performed Right: Not performed  Too many toes sign: Left: Not performed Right: Not performed    General appearance: NAD  Peripheral pulses: normal bilaterally   Reflexes: Left: normal Right normal   Sensation: normal    Labs:  Last A1c: The patient doesn't have any registry metric data available     Imaging:   Previous images reviewed.  X-rays ordered and performed today: yes  # of views: 3 Laterality: bilateral  My Interpretation:  degenerative changes of ankle joint with osteophyte formation and joint space narrowing     Assessment:        Encounter Diagnoses   Code Name Primary?    M19.071, M19.072 Arthritis of both ankles Yes    R22.42 Ankle mass, left     M19.90 Inflammatory arthritis     M65.9 Synovitis         Plan:       MDM: Prior external referring provider notes reviewed. Prior external referring provider studies reviewed.   Dx:  Bilateral ankle osteoarthritis.  Left subcutaneous tissue complex cyst.  Synovitis bilateral ankles.  Inflammatory arthritis.  Chronic in moderate exacerbation.    Treatment Plan:  Discussed with patient diagnosis, prognosis, and treatment recommendations. Education provided.    Recommend patient keep his appointment with Rheumatology in the couple of weeks.  I believe much of the synovitis and osteoarthritis is due to this inflammatory arthropathy.    For soft tissue mass recommend he follow up with PCP for referral to General surgery if he wishes for this mass to be removed.  From musculoskeletal standpoint it does not seem to be causing much pain at the moment but he does complain numbness and tingling to the lateral left foot therefore can not rule out superficial nerve impingement.  Of note patient does have history of lumbar spondylosis however SLR negative on the left today.  Recommend medial support shoe inserts.  We will start 5 day course of prednisone 20 mg p.o. for synovitis.  Follow up in 3 months.  Consider CSI.  Imaging: radiological studies ordered and independently reviewed; discussed with patient; pending radiologist interpretation.   Activity: Activity as tolerated; HEP to include aerobic conditioning and strength training with non-painful activity. ROM/STG exercises. Proper footware; assistive devises to avoid limping.   Therapy: Physical Therapy  Medication: START prednisone 20mg PO daily for 5 days . Please see your primary care physician for further refills.  RTC: 3 months.      Jaylen De La Fuente MD.  Note dictated using MModal.

## 2024-05-20 NOTE — PROGRESS NOTES
Faculty Attestation: Mitch Ruiz  was seen in [facility] Medicine Clinic. Discussed with Dr. De La Fuente at the time of the visit. History of Present Illness, Physical Exam, and Assessment and Plan reviewed. Treatment plan is reasonable and appropriate. Compliance with treatment recommendations is important.  Radiology images independently reviewed and agree with radiologist interpretation.  No procedure was performed.     Berlin Balderrama MD  Sports Medicine

## 2024-10-24 ENCOUNTER — PATIENT MESSAGE (OUTPATIENT)
Dept: RESEARCH | Facility: HOSPITAL | Age: 64
End: 2024-10-24
Payer: MEDICAID

## 2025-07-07 ENCOUNTER — HOSPITAL ENCOUNTER (OUTPATIENT)
Dept: RADIOLOGY | Facility: HOSPITAL | Age: 65
Discharge: HOME | End: 2025-07-07
Attending: INTERNAL MEDICINE
Payer: COMMERCIAL

## 2025-07-07 ENCOUNTER — OFFICE VISIT (OUTPATIENT)
Dept: RHEUMATOLOGY | Facility: CLINIC | Age: 65
End: 2025-07-07
Payer: COMMERCIAL

## 2025-07-07 VITALS
WEIGHT: 146.63 LBS | HEART RATE: 79 BPM | HEIGHT: 67 IN | OXYGEN SATURATION: 97 % | TEMPERATURE: 98 F | DIASTOLIC BLOOD PRESSURE: 98 MMHG | RESPIRATION RATE: 18 BRPM | BODY MASS INDEX: 23.01 KG/M2 | SYSTOLIC BLOOD PRESSURE: 138 MMHG

## 2025-07-07 DIAGNOSIS — R76.8 ANA POSITIVE: Primary | ICD-10-CM

## 2025-07-07 DIAGNOSIS — R76.8 ANA POSITIVE: ICD-10-CM

## 2025-07-07 DIAGNOSIS — M15.0 PRIMARY OSTEOARTHRITIS INVOLVING MULTIPLE JOINTS: ICD-10-CM

## 2025-07-07 DIAGNOSIS — M25.862 MASS OF JOINT OF LEFT KNEE: ICD-10-CM

## 2025-07-07 DIAGNOSIS — M79.672 CHRONIC PAIN OF BOTH FEET: ICD-10-CM

## 2025-07-07 DIAGNOSIS — Z96.612 STATUS POST REPLACEMENT OF BOTH SHOULDER JOINTS: ICD-10-CM

## 2025-07-07 DIAGNOSIS — R01.1 MURMUR, CARDIAC: ICD-10-CM

## 2025-07-07 DIAGNOSIS — M79.671 CHRONIC PAIN OF BOTH FEET: ICD-10-CM

## 2025-07-07 DIAGNOSIS — Z96.611 STATUS POST REPLACEMENT OF BOTH SHOULDER JOINTS: ICD-10-CM

## 2025-07-07 DIAGNOSIS — G89.29 CHRONIC PAIN OF BOTH FEET: ICD-10-CM

## 2025-07-07 PROCEDURE — 1159F MED LIST DOCD IN RCRD: CPT | Mod: CPTII,,, | Performed by: INTERNAL MEDICINE

## 2025-07-07 PROCEDURE — 99214 OFFICE O/P EST MOD 30 MIN: CPT | Mod: PBBFAC | Performed by: INTERNAL MEDICINE

## 2025-07-07 PROCEDURE — 3075F SYST BP GE 130 - 139MM HG: CPT | Mod: CPTII,,, | Performed by: INTERNAL MEDICINE

## 2025-07-07 PROCEDURE — 99205 OFFICE O/P NEW HI 60 MIN: CPT | Mod: S$PBB,,, | Performed by: INTERNAL MEDICINE

## 2025-07-07 PROCEDURE — 3080F DIAST BP >= 90 MM HG: CPT | Mod: CPTII,,, | Performed by: INTERNAL MEDICINE

## 2025-07-07 PROCEDURE — 86235 NUCLEAR ANTIGEN ANTIBODY: CPT

## 2025-07-07 PROCEDURE — 73560 X-RAY EXAM OF KNEE 1 OR 2: CPT | Mod: TC,RT

## 2025-07-07 PROCEDURE — 73560 X-RAY EXAM OF KNEE 1 OR 2: CPT | Mod: TC,LT

## 2025-07-07 PROCEDURE — 3008F BODY MASS INDEX DOCD: CPT | Mod: CPTII,,, | Performed by: INTERNAL MEDICINE

## 2025-07-07 NOTE — PROGRESS NOTES
Patient ID: [id number]     Chief Complaint: Abnormal Lab (Patient is here is for abnormal lab positive MICHAEL. Patient is complaining of generalized joint pain and swelling- lower extremities. )      Referred By: Jessika Wood     HPI:     Mitch Ruiz is a 64 y.o. male here today for a new patient visit.      Chronic pain in bilateral feet for last 10 years. Admits swelling in feet with pain. 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 Bilateral total shoulder replacement in the past. Shoulder pain is better now.   His son lives in [place], feet pain better when he goes there.   He has memory issues, worse with gabapentin and stopped taking it.   Sleep is poor. Currently not taking any medications.   He has large property and he stays active. He retired 1-2 years back.   He had one episode of iritis long back, around 2007, only one episode and no recurrence, resolved with steroid drops.     Denies history of fevers, rashes, photosensitivity, oral or nasal ulcers, h/o MI, stroke, seizures, h/o PE or DVT, Raynaud's phenomenon, malignancies.   Family history of autoimmune disease: none.   Smoking: Quit smoking 2 years back.      Tobacco Use History[1]       No past medical history on file.     Past Surgical History:   Procedure Laterality Date    right arm Right     SHOULDER ARTHROSCOPY Bilateral        Review of patient's allergies indicates:  No Known Allergies    No outpatient medications have been marked as taking for the 7/7/25 encounter (Office Visit) with Ehsan Cook MD.       Social History[2]     Family History   Problem Relation Name Age of Onset    Dementia Mother      Heart disease Father      Cancer Father      Lung cancer Father      Lung cancer Sister      Breast cancer Sister            There is no immunization history on file for this patient.    Patient Care Team:  Jessika Wood FNP-C as PCP - General (Family  "Medicine)     Subjective:     Constitutional:  Denies chills. Denies fever. Denies night sweats. Denies weight loss.   Ophthalmology: Denies blurred vision. Denies dry eyes. Denies eye pain. Denies Itching and redness.   ENT: Denies oral ulcers. Denies epistaxis. Denies dry mouth. Denies swollen glands.   Endocrine: Denies diabetes. Denies thyroid Problems.   Respiratory: Denies cough. Denies shortness of breath. Denies shortness of breath with exertion. Denies hemoptysis.   Cardiovascular: Denies chest pain at rest. Denies chest pain with exertion. Denies palpitations.    Gastrointestinal: Denies abdominal pain. Denies diarrhea. Denies nausea. Denies vomiting. Denies hematemesis or hematochezia. Denies heartburn.  Genitourinary: Denies blood in urine.  Musculoskeletal: See HPI for details  Integumentary: Denies rash. Denies photosensitivity.   Peripheral Vascular: Denies Ulcers of hands and/or feet. Denies Cold extremities.   Neurologic: Denies dizziness. Denies headache.  Denies loss of strength. Denies numbness or tingling.   Psychiatric: Denies depression. Denies anxiety. Denies suicidal/homicidal ideations.      Objective:     BP (!) 138/98 (BP Location: Left arm, Patient Position: Sitting)   Pulse 79   Temp 98.2 °F (36.8 °C) (Oral)   Resp 18   Ht 5' 7" (1.702 m)   Wt 66.5 kg (146 lb 9.6 oz)   SpO2 97%   BMI 22.96 kg/m²     Physical Exam    General Appearance: alert, pleasant, in no acute distress.  Skin: Skin color, texture, turgor normal. No rashes or lesions.  Dry skin.  Eyes:  extraocular movement intact (EOMI), pupils equal, round, reactive to light and accommodation, conjunctiva clear.  ENT: No oral or nasal ulcers.  Neck:  Neck supple. No adenopathy.   Lungs: CTA throughout without crackles, rhonchi, or wheezes.   Heart: RRR w/o murmurs.  No edema. 2+ DP pulse.  Abdomen: Soft, non-tender, no masses, rebound or guarding.  Neuro: Alert, oriented, CN II-XII GI, sensory and motor innervation " intact.  Musculoskeletal:  Surgical scar on bilateral shoulders.  DJD changes in bilateral hands.  2-3 cm freely mobile firm mass on lateral side of left knee, DJD changes in bilateral knees.  DJD changes in bilateral feet, swelling of midfoot bilaterally, worse on left.  No redness or tenderness or warmth of any joint.  Psych: Alert, oriented, normal eye contact.      Labs:   2024:  Folic acid level normal.  Vitamin B12 normal.  Rheumatoid factor negative.  MICHAEL 1:160, dense fine speckled pattern.  HIV negative.  24:  CBC okay.  CMP okay.  No protein and and blood in urine.    Imagin2024: DJD changes in left ankle.  Severe DJD changes in right ankle-in talotibial, subtalar, talonavicular joint with multiple osteophytes.  DJD changes in bilateral feet.    Assessment:       ICD-10-CM ICD-9-CM   1. MICHAEL positive  R76.8 795.79   2. Primary osteoarthritis involving multiple joints  M15.0 715.98   3. Status post replacement of both shoulder joints  Z96.611 V43.61    Z96.612    4. Chronic pain of both feet  M79.671 729.5    G89.29 338.29    M79.672    5. Murmur, cardiac  R01.1 785.2   6. Mass of joint of left knee  M25.862 719.66        Plan:     1. MICHAEL positive:  Likely false positive MICHAEL.  Currently does not have any signs or symptoms of lupus.  I will check ENAs to complete workup.  Follow-up based on test results.       2. Primary osteoarthritis involving multiple joints:  Osteoarthritis of bilateral hands, shoulders, knees and feet.  Discussed symptomatic management of osteoarthritis.       3. Freely mobile mass in left knee:  Order x-ray of bilateral knees.       4. Chronic pain of both feet:  Advised to continue to use shoe inserts and ankle support.  Advised to see Podiatry.       5. Murmur, cardiac:  Chronic per patient, he had cardiac workup done in the past and it was normal per patient.          Clinical features of osteoarthritis (degenerative joint disease) were discussed indetail.  OA  "is a degenerative arthritis, which could be due to underlying trauma, injuries, over-use, post-inflammatory state, metabolic disorders, obesity, etc.  This causes a gradual "wear and tear" destruction of joint(s).  Symptoms can be exacerbated by increased pressure on the joints such as being obese/overweight or applying excess pressure on these joints.  Discussed treatment options of OA in detail.  Treatments include pain medications to relieve the pain(tylenol), physical therapy, exercise, weight loss.  Other treatments may include corticosteroid injections to help relieve the pain or surgical replacement of specific joints.       No follow-ups on file. In addition to their scheduled follow up, the patient has also been instructed to follow up on as needed basis.        Total time spent with patient and documentation is 60 minutes. All questions were answered to patient's satisfaction and patient verbalized understanding.            [1]   Social History  Tobacco Use   Smoking Status Former    Types: Cigarettes    Passive exposure: Never   Smokeless Tobacco Never   [2]   Social History  Socioeconomic History    Marital status: Single   Tobacco Use    Smoking status: Former     Types: Cigarettes     Passive exposure: Never    Smokeless tobacco: Never   Substance and Sexual Activity    Alcohol use: Yes     Comment: six pack a day every few days    Drug use: Never    Sexual activity: Not Currently     "

## 2025-07-15 ENCOUNTER — TELEPHONE (OUTPATIENT)
Dept: RHEUMATOLOGY | Facility: CLINIC | Age: 65
End: 2025-07-15
Payer: COMMERCIAL

## 2025-07-15 NOTE — TELEPHONE ENCOUNTER
Please let the patient know that all the specific tests I did for work up of positive MICHAEL came back negative. He does not have lupus now. Likely false positive MICHAEL.     X-ray of left knee showed calcification of soft tissue, that is why he is feeling that mass above left knee.  If it is bothering him or growing in size- recommend follow-up with Orthopedics.  If he wants to see Orthopedics, he can talk to his PCP about referral to Orthopedics.    No need for follow up with me. Follow up with PCP and can be re referred for new issues in the future.